# Patient Record
Sex: FEMALE | URBAN - METROPOLITAN AREA
[De-identification: names, ages, dates, MRNs, and addresses within clinical notes are randomized per-mention and may not be internally consistent; named-entity substitution may affect disease eponyms.]

---

## 2023-10-09 ENCOUNTER — APPOINTMENT (OUTPATIENT)
Dept: URBAN - METROPOLITAN AREA CLINIC 279 | Age: 63
Setting detail: DERMATOLOGY
End: 2023-10-16

## 2023-10-09 DIAGNOSIS — R21 RASH AND OTHER NONSPECIFIC SKIN ERUPTION: ICD-10-CM

## 2023-10-09 DIAGNOSIS — D49.2 NEOPLASM OF UNSPECIFIED BEHAVIOR OF BONE, SOFT TISSUE, AND SKIN: ICD-10-CM

## 2023-10-09 DIAGNOSIS — L82.1 OTHER SEBORRHEIC KERATOSIS: ICD-10-CM

## 2023-10-09 PROCEDURE — OTHER MEDICATION COUNSELING: OTHER

## 2023-10-09 PROCEDURE — 99203 OFFICE O/P NEW LOW 30 MIN: CPT

## 2023-10-09 PROCEDURE — OTHER PRESCRIPTION: OTHER

## 2023-10-09 PROCEDURE — OTHER COUNSELING: OTHER

## 2023-10-09 PROCEDURE — OTHER DEFER: OTHER

## 2023-10-09 PROCEDURE — OTHER OBSERVATION: OTHER

## 2023-10-09 PROCEDURE — OTHER TREATMENT REGIMEN: OTHER

## 2023-10-09 PROCEDURE — OTHER ADDITIONAL NOTES: OTHER

## 2023-10-09 PROCEDURE — OTHER REASSURANCE: OTHER

## 2023-10-09 PROCEDURE — OTHER PHOTO-DOCUMENTATION: OTHER

## 2023-10-09 PROCEDURE — OTHER MIPS QUALITY: OTHER

## 2023-10-09 RX ORDER — BETAMETHASONE DIPROPIONATE 0.5 MG/G
OINTMENT TOPICAL
Qty: 45 | Refills: 0 | Status: ERX | COMMUNITY
Start: 2023-10-09

## 2023-10-09 RX ORDER — KETOCONAZOLE 20 MG/G
CREAM TOPICAL
Qty: 60 | Refills: 1 | Status: ERX | COMMUNITY
Start: 2023-10-09

## 2023-10-09 ASSESSMENT — LOCATION SIMPLE DESCRIPTION DERM: LOCATION SIMPLE: CHEST

## 2023-10-09 ASSESSMENT — LOCATION DETAILED DESCRIPTION DERM: LOCATION DETAILED: RIGHT LATERAL SUPERIOR CHEST

## 2023-10-09 ASSESSMENT — LOCATION ZONE DERM: LOCATION ZONE: TRUNK

## 2023-10-09 NOTE — PROCEDURE: MEDICATION COUNSELING
Mom calls back.  Returned call and informed of the message below.  Mom states understanding and denies further needs at this time.   Solaraze Counseling:  I discussed with the patient the risks of Solaraze including but not limited to erythema, scaling, itching, weeping, crusting, and pain.

## 2023-10-09 NOTE — PROCEDURE: DEFER
Detail Level: Detailed
Introduction Text (Please End With A Colon): The following procedure was deferred:
Size Of Lesion In Cm (Optional): 0
Instructions (Optional): Pt instructed to follow up with PCP for imaging of this site.

## 2023-10-09 NOTE — PROCEDURE: REASSURANCE
Hide Include Location In Plan Question?: No
Additional Note: If lesion changes, enlarges, or becomes symptomatic, patient will return to clinic for reevaluation. Patient was advised to observe using ABCDEs.
Detail Level: Zone

## 2023-10-09 NOTE — PROCEDURE: TREATMENT REGIMEN
Initiate Treatment: Betamethasone ointment AAA BID PRN \\n\\nKetoconazole 2% cream AAA BID PRN
Detail Level: Zone

## 2023-10-09 NOTE — PROCEDURE: ADDITIONAL NOTES
Additional Notes: Per patient and MD, this is deep below the skin and not a dermatology issue. She confirmed she will go back to PCP for imaging and that the NUB has been stable for the last few years. Pt was instructed that if she does not make any progress on workup through her PCP for imaging that she will return to the clinic for assistance.
Detail Level: Simple
Render Risk Assessment In Note?: no
Additional Notes: Pt verbalized she would like to treat condition as tinea versicolor. Dr. Garcia suggested to the patient a possible biopsy to confirm the diagnoses but patient declined. Explained to the patient that a biopsy is the best way to confirm the diagnosis. However, she would like to follow for now. She agrees to return clinic immediately with any changes, symptoms, enlarging, or concerns for reevaluation.\\n\\nSPF 30+ BS QD

## 2023-10-09 NOTE — PROCEDURE: MEDICATION COUNSELING
[FreeTextEntry1] : No significant medical hx Elidel Counseling: Patient may experience a mild burning sensation during topical application. Elidel is not approved in children less than 2 years of age. There have been case reports of hematologic and skin malignancies in patients using topical calcineurin inhibitors although causality is questionable.